# Patient Record
Sex: MALE | Race: WHITE | NOT HISPANIC OR LATINO
[De-identification: names, ages, dates, MRNs, and addresses within clinical notes are randomized per-mention and may not be internally consistent; named-entity substitution may affect disease eponyms.]

---

## 2020-10-17 ENCOUNTER — TRANSCRIPTION ENCOUNTER (OUTPATIENT)
Age: 60
End: 2020-10-17

## 2020-10-17 VITALS
HEIGHT: 69 IN | HEART RATE: 81 BPM | RESPIRATION RATE: 20 BRPM | WEIGHT: 205.03 LBS | SYSTOLIC BLOOD PRESSURE: 167 MMHG | OXYGEN SATURATION: 98 % | DIASTOLIC BLOOD PRESSURE: 97 MMHG | TEMPERATURE: 98 F

## 2020-10-17 LAB
ALBUMIN SERPL ELPH-MCNC: 3.7 G/DL — SIGNIFICANT CHANGE UP (ref 3.4–5)
ALP SERPL-CCNC: 57 U/L — SIGNIFICANT CHANGE UP (ref 40–120)
ALT FLD-CCNC: 60 U/L — HIGH (ref 12–42)
ANION GAP SERPL CALC-SCNC: 6 MMOL/L — LOW (ref 9–16)
APTT BLD: 29.6 SEC — SIGNIFICANT CHANGE UP (ref 27.5–35.5)
AST SERPL-CCNC: 56 U/L — HIGH (ref 15–37)
BASOPHILS # BLD AUTO: 0.05 K/UL — SIGNIFICANT CHANGE UP (ref 0–0.2)
BASOPHILS NFR BLD AUTO: 0.7 % — SIGNIFICANT CHANGE UP (ref 0–2)
BILIRUB SERPL-MCNC: 0.5 MG/DL — SIGNIFICANT CHANGE UP (ref 0.2–1.2)
BUN SERPL-MCNC: 21 MG/DL — SIGNIFICANT CHANGE UP (ref 7–23)
CALCIUM SERPL-MCNC: 9 MG/DL — SIGNIFICANT CHANGE UP (ref 8.5–10.5)
CHLORIDE SERPL-SCNC: 104 MMOL/L — SIGNIFICANT CHANGE UP (ref 96–108)
CO2 SERPL-SCNC: 31 MMOL/L — SIGNIFICANT CHANGE UP (ref 22–31)
CREAT SERPL-MCNC: 1.42 MG/DL — HIGH (ref 0.5–1.3)
EOSINOPHIL # BLD AUTO: 0.23 K/UL — SIGNIFICANT CHANGE UP (ref 0–0.5)
EOSINOPHIL NFR BLD AUTO: 3.3 % — SIGNIFICANT CHANGE UP (ref 0–6)
GLUCOSE SERPL-MCNC: 104 MG/DL — HIGH (ref 70–99)
HCT VFR BLD CALC: 41.6 % — SIGNIFICANT CHANGE UP (ref 39–50)
HGB BLD-MCNC: 14.2 G/DL — SIGNIFICANT CHANGE UP (ref 13–17)
IMM GRANULOCYTES NFR BLD AUTO: 1.8 % — HIGH (ref 0–1.5)
INR BLD: 0.97 — SIGNIFICANT CHANGE UP (ref 0.88–1.16)
LIDOCAIN IGE QN: 232 U/L — SIGNIFICANT CHANGE UP (ref 73–393)
LYMPHOCYTES # BLD AUTO: 3.41 K/UL — HIGH (ref 1–3.3)
LYMPHOCYTES # BLD AUTO: 48.4 % — HIGH (ref 13–44)
MCHC RBC-ENTMCNC: 30.9 PG — SIGNIFICANT CHANGE UP (ref 27–34)
MCHC RBC-ENTMCNC: 34.1 GM/DL — SIGNIFICANT CHANGE UP (ref 32–36)
MCV RBC AUTO: 90.6 FL — SIGNIFICANT CHANGE UP (ref 80–100)
MONOCYTES # BLD AUTO: 0.81 K/UL — SIGNIFICANT CHANGE UP (ref 0–0.9)
MONOCYTES NFR BLD AUTO: 11.5 % — SIGNIFICANT CHANGE UP (ref 2–14)
NEUTROPHILS # BLD AUTO: 2.42 K/UL — SIGNIFICANT CHANGE UP (ref 1.8–7.4)
NEUTROPHILS NFR BLD AUTO: 34.3 % — LOW (ref 43–77)
NRBC # BLD: 0 /100 WBCS — SIGNIFICANT CHANGE UP (ref 0–0)
NT-PROBNP SERPL-SCNC: 22 PG/ML — SIGNIFICANT CHANGE UP
PLATELET # BLD AUTO: 183 K/UL — SIGNIFICANT CHANGE UP (ref 150–400)
POTASSIUM SERPL-MCNC: 4 MMOL/L — SIGNIFICANT CHANGE UP (ref 3.5–5.3)
POTASSIUM SERPL-SCNC: 4 MMOL/L — SIGNIFICANT CHANGE UP (ref 3.5–5.3)
PROT SERPL-MCNC: 7.1 G/DL — SIGNIFICANT CHANGE UP (ref 6.4–8.2)
PROTHROM AB SERPL-ACNC: 11.5 SEC — SIGNIFICANT CHANGE UP (ref 10.6–13.6)
RBC # BLD: 4.59 M/UL — SIGNIFICANT CHANGE UP (ref 4.2–5.8)
RBC # FLD: 11.9 % — SIGNIFICANT CHANGE UP (ref 10.3–14.5)
SODIUM SERPL-SCNC: 141 MMOL/L — SIGNIFICANT CHANGE UP (ref 132–145)
TROPONIN I SERPL-MCNC: <0.017 NG/ML — LOW (ref 0.02–0.06)
WBC # BLD: 7.05 K/UL — SIGNIFICANT CHANGE UP (ref 3.8–10.5)
WBC # FLD AUTO: 7.05 K/UL — SIGNIFICANT CHANGE UP (ref 3.8–10.5)

## 2020-10-17 PROCEDURE — 71046 X-RAY EXAM CHEST 2 VIEWS: CPT | Mod: 26

## 2020-10-17 RX ORDER — ASPIRIN/CALCIUM CARB/MAGNESIUM 324 MG
162 TABLET ORAL ONCE
Refills: 0 | Status: COMPLETED | OUTPATIENT
Start: 2020-10-17 | End: 2020-10-17

## 2020-10-17 RX ORDER — IOHEXOL 300 MG/ML
30 INJECTION, SOLUTION INTRAVENOUS ONCE
Refills: 0 | Status: COMPLETED | OUTPATIENT
Start: 2020-10-17 | End: 2020-10-17

## 2020-10-17 RX ADMIN — Medication 162 MILLIGRAM(S): at 23:28

## 2020-10-17 RX ADMIN — Medication 30 MILLILITER(S): at 23:28

## 2020-10-17 RX ADMIN — IOHEXOL 30 MILLILITER(S): 300 INJECTION, SOLUTION INTRAVENOUS at 23:28

## 2020-10-17 NOTE — ED PROVIDER NOTE - CLINICAL SUMMARY MEDICAL DECISION MAKING FREE TEXT BOX
Pt w epigastric pain, lower chest pain, on evaluation tender in epigastric area. EKG non ischemic. Will check cardiac labs, abd labs, get CXR, treat w GI cocktail and give ASA. Bedside GB sono - no signs of cholecystitis.

## 2020-10-17 NOTE — ED PROVIDER NOTE - PROGRESS NOTE DETAILS
labs w transaminitis, normal lipase, normal white count. CT w concern for early cholecystitis, GB swelling w perichol inflammation. Spoke to Sx Dr Reynoso for admission. accepts to [facility].

## 2020-10-17 NOTE — ED PROVIDER NOTE - OBJECTIVE STATEMENT
60 male pt, hx of HTN (losartan), GERD (pepcid), and on Descovy (PreP). Presents w epigastric and lower mid chest pain, described as pressure, non radiating fo 1 hr. no nausea, no vomiting, no diaphoresis, no leg pain, no focal weakness, no leg swelling, no sob, no cough,. no fever, no chills. He took a Pepcid and baby ASA prior to arrival. Pain started after he ate some greek yogurt. He was concerned his pain was related to his heart so came to ED. no prior abd surgeries.

## 2020-10-17 NOTE — ED ADULT NURSE NOTE - OBJECTIVE STATEMENT
Pt is a 60y male complaining of epigastric pain described as ripping in nature x1 hour. Pt denies nausea and vomiting. Pt is a 60y male complaining of epigastric pain described as tearing/pressure  in nature x1 hour. Pt denies nausea, vomiting, and sob. Pt took baby aspirin and Pepcid prior to arrival.

## 2020-10-17 NOTE — ED ADULT NURSE NOTE - NSIMPLEMENTINTERV_GEN_ALL_ED
Implemented All Universal Safety Interventions:  [place] to call system. Call bell, personal items and telephone within reach. Instruct patient to call for assistance. Room bathroom lighting operational. Non-slip footwear when patient is off stretcher. Physically safe environment: no spills, clutter or unnecessary equipment. Stretcher in lowest position, wheels locked, appropriate side rails in place.

## 2020-10-17 NOTE — ED PROVIDER NOTE - DIAGNOSTIC INTERPRETATION
Interpreted by ED physician  Chest x-ray, 2 views  Lungs clear, heart shadow normal, bony structures normal, no free air under diaphragm, no PTX

## 2020-10-18 ENCOUNTER — INPATIENT (INPATIENT)
Facility: HOSPITAL | Age: 60
LOS: 0 days | Discharge: HOME | DRG: 418 | End: 2020-10-19
Attending: SURGERY | Admitting: SURGERY
Payer: COMMERCIAL

## 2020-10-18 ENCOUNTER — RESULT REVIEW (OUTPATIENT)
Age: 60
End: 2020-10-18

## 2020-10-18 DIAGNOSIS — N17.9 ACUTE KIDNEY FAILURE, UNSPECIFIED: ICD-10-CM

## 2020-10-18 DIAGNOSIS — R74.01 ELEVATION OF LEVELS OF LIVER TRANSAMINASE LEVELS: ICD-10-CM

## 2020-10-18 DIAGNOSIS — I10 ESSENTIAL (PRIMARY) HYPERTENSION: ICD-10-CM

## 2020-10-18 DIAGNOSIS — E66.9 OBESITY, UNSPECIFIED: ICD-10-CM

## 2020-10-18 DIAGNOSIS — K21.9 GASTRO-ESOPHAGEAL REFLUX DISEASE WITHOUT ESOPHAGITIS: ICD-10-CM

## 2020-10-18 DIAGNOSIS — R09.82 POSTNASAL DRIP: ICD-10-CM

## 2020-10-18 LAB
ALBUMIN SERPL ELPH-MCNC: 4.2 G/DL — SIGNIFICANT CHANGE UP (ref 3.3–5)
ALP SERPL-CCNC: 86 U/L — SIGNIFICANT CHANGE UP (ref 40–120)
ALT FLD-CCNC: 307 U/L — HIGH (ref 10–45)
ANION GAP SERPL CALC-SCNC: 9 MMOL/L — SIGNIFICANT CHANGE UP (ref 5–17)
APPEARANCE UR: CLEAR — SIGNIFICANT CHANGE UP
AST SERPL-CCNC: 247 U/L — HIGH (ref 10–40)
BILIRUB SERPL-MCNC: 0.5 MG/DL — SIGNIFICANT CHANGE UP (ref 0.2–1.2)
BILIRUB UR-MCNC: NEGATIVE — SIGNIFICANT CHANGE UP
BUN SERPL-MCNC: 13 MG/DL — SIGNIFICANT CHANGE UP (ref 7–23)
CALCIUM SERPL-MCNC: 9.1 MG/DL — SIGNIFICANT CHANGE UP (ref 8.4–10.5)
CHLORIDE SERPL-SCNC: 102 MMOL/L — SIGNIFICANT CHANGE UP (ref 96–108)
CO2 SERPL-SCNC: 26 MMOL/L — SIGNIFICANT CHANGE UP (ref 22–31)
COLOR SPEC: YELLOW — SIGNIFICANT CHANGE UP
CREAT SERPL-MCNC: 1.01 MG/DL — SIGNIFICANT CHANGE UP (ref 0.5–1.3)
DIFF PNL FLD: NEGATIVE — SIGNIFICANT CHANGE UP
GLUCOSE SERPL-MCNC: 103 MG/DL — HIGH (ref 70–99)
GLUCOSE UR QL: NEGATIVE — SIGNIFICANT CHANGE UP
HCT VFR BLD CALC: 41.6 % — SIGNIFICANT CHANGE UP (ref 39–50)
HGB BLD-MCNC: 14.2 G/DL — SIGNIFICANT CHANGE UP (ref 13–17)
KETONES UR-MCNC: NEGATIVE — SIGNIFICANT CHANGE UP
LEUKOCYTE ESTERASE UR-ACNC: NEGATIVE — SIGNIFICANT CHANGE UP
MAGNESIUM SERPL-MCNC: 2.3 MG/DL — SIGNIFICANT CHANGE UP (ref 1.6–2.6)
MCHC RBC-ENTMCNC: 31.1 PG — SIGNIFICANT CHANGE UP (ref 27–34)
MCHC RBC-ENTMCNC: 34.1 GM/DL — SIGNIFICANT CHANGE UP (ref 32–36)
MCV RBC AUTO: 91.2 FL — SIGNIFICANT CHANGE UP (ref 80–100)
NITRITE UR-MCNC: NEGATIVE — SIGNIFICANT CHANGE UP
NRBC # BLD: 0 /100 WBCS — SIGNIFICANT CHANGE UP (ref 0–0)
PCP SPEC-MCNC: SIGNIFICANT CHANGE UP
PH UR: 6 — SIGNIFICANT CHANGE UP (ref 5–8)
PHOSPHATE SERPL-MCNC: 3.1 MG/DL — SIGNIFICANT CHANGE UP (ref 2.5–4.5)
PLATELET # BLD AUTO: 176 K/UL — SIGNIFICANT CHANGE UP (ref 150–400)
POTASSIUM SERPL-MCNC: 4.6 MMOL/L — SIGNIFICANT CHANGE UP (ref 3.5–5.3)
POTASSIUM SERPL-SCNC: 4.6 MMOL/L — SIGNIFICANT CHANGE UP (ref 3.5–5.3)
PROT SERPL-MCNC: 6.7 G/DL — SIGNIFICANT CHANGE UP (ref 6–8.3)
PROT UR-MCNC: NEGATIVE MG/DL — SIGNIFICANT CHANGE UP
RBC # BLD: 4.56 M/UL — SIGNIFICANT CHANGE UP (ref 4.2–5.8)
RBC # FLD: 11.8 % — SIGNIFICANT CHANGE UP (ref 10.3–14.5)
SARS-COV-2 RNA SPEC QL NAA+PROBE: SIGNIFICANT CHANGE UP
SODIUM SERPL-SCNC: 137 MMOL/L — SIGNIFICANT CHANGE UP (ref 135–145)
SP GR SPEC: 1.02 — SIGNIFICANT CHANGE UP (ref 1–1.03)
TROPONIN I SERPL-MCNC: <0.017 NG/ML — LOW (ref 0.02–0.06)
UROBILINOGEN FLD QL: 0.2 E.U./DL — SIGNIFICANT CHANGE UP
WBC # BLD: 6.36 K/UL — SIGNIFICANT CHANGE UP (ref 3.8–10.5)
WBC # FLD AUTO: 6.36 K/UL — SIGNIFICANT CHANGE UP (ref 3.8–10.5)

## 2020-10-18 PROCEDURE — 88300 SURGICAL PATH GROSS: CPT | Mod: 26

## 2020-10-18 PROCEDURE — 71046 X-RAY EXAM CHEST 2 VIEWS: CPT | Mod: 26

## 2020-10-18 PROCEDURE — 88304 TISSUE EXAM BY PATHOLOGIST: CPT | Mod: 26

## 2020-10-18 PROCEDURE — 74177 CT ABD & PELVIS W/CONTRAST: CPT | Mod: 26

## 2020-10-18 PROCEDURE — 93010 ELECTROCARDIOGRAM REPORT: CPT

## 2020-10-18 PROCEDURE — 99223 1ST HOSP IP/OBS HIGH 75: CPT

## 2020-10-18 PROCEDURE — 99285 EMERGENCY DEPT VISIT HI MDM: CPT | Mod: 25

## 2020-10-18 RX ORDER — KETOROLAC TROMETHAMINE 30 MG/ML
15 SYRINGE (ML) INJECTION EVERY 6 HOURS
Refills: 0 | Status: DISCONTINUED | OUTPATIENT
Start: 2020-10-18 | End: 2020-10-19

## 2020-10-18 RX ORDER — ONDANSETRON 8 MG/1
4 TABLET, FILM COATED ORAL ONCE
Refills: 0 | Status: COMPLETED | OUTPATIENT
Start: 2020-10-18 | End: 2020-10-18

## 2020-10-18 RX ORDER — OXYCODONE HYDROCHLORIDE 5 MG/1
5 TABLET ORAL EVERY 4 HOURS
Refills: 0 | Status: DISCONTINUED | OUTPATIENT
Start: 2020-10-18 | End: 2020-10-19

## 2020-10-18 RX ORDER — CEFTRIAXONE 500 MG/1
1000 INJECTION, POWDER, FOR SOLUTION INTRAMUSCULAR; INTRAVENOUS ONCE
Refills: 0 | Status: COMPLETED | OUTPATIENT
Start: 2020-10-18 | End: 2020-10-18

## 2020-10-18 RX ORDER — ACETAMINOPHEN 500 MG
1000 TABLET ORAL ONCE
Refills: 0 | Status: COMPLETED | OUTPATIENT
Start: 2020-10-18 | End: 2020-10-18

## 2020-10-18 RX ORDER — METRONIDAZOLE 500 MG
500 TABLET ORAL EVERY 8 HOURS
Refills: 0 | Status: DISCONTINUED | OUTPATIENT
Start: 2020-10-18 | End: 2020-10-18

## 2020-10-18 RX ORDER — OXYCODONE HYDROCHLORIDE 5 MG/1
2.5 TABLET ORAL EVERY 4 HOURS
Refills: 0 | Status: DISCONTINUED | OUTPATIENT
Start: 2020-10-18 | End: 2020-10-19

## 2020-10-18 RX ORDER — ACETAMINOPHEN 500 MG
650 TABLET ORAL EVERY 4 HOURS
Refills: 0 | Status: DISCONTINUED | OUTPATIENT
Start: 2020-10-18 | End: 2020-10-19

## 2020-10-18 RX ORDER — METRONIDAZOLE 500 MG
TABLET ORAL
Refills: 0 | Status: DISCONTINUED | OUTPATIENT
Start: 2020-10-18 | End: 2020-10-18

## 2020-10-18 RX ORDER — METRONIDAZOLE 500 MG
500 TABLET ORAL ONCE
Refills: 0 | Status: COMPLETED | OUTPATIENT
Start: 2020-10-18 | End: 2020-10-18

## 2020-10-18 RX ORDER — INFLUENZA VIRUS VACCINE 15; 15; 15; 15 UG/.5ML; UG/.5ML; UG/.5ML; UG/.5ML
0.5 SUSPENSION INTRAMUSCULAR ONCE
Refills: 0 | Status: DISCONTINUED | OUTPATIENT
Start: 2020-10-18 | End: 2020-10-19

## 2020-10-18 RX ORDER — HEPARIN SODIUM 5000 [USP'U]/ML
5000 INJECTION INTRAVENOUS; SUBCUTANEOUS EVERY 8 HOURS
Refills: 0 | Status: DISCONTINUED | OUTPATIENT
Start: 2020-10-18 | End: 2020-10-19

## 2020-10-18 RX ORDER — SODIUM CHLORIDE 9 MG/ML
1000 INJECTION, SOLUTION INTRAVENOUS
Refills: 0 | Status: DISCONTINUED | OUTPATIENT
Start: 2020-10-18 | End: 2020-10-19

## 2020-10-18 RX ADMIN — Medication 100 MILLIGRAM(S): at 09:57

## 2020-10-18 RX ADMIN — SODIUM CHLORIDE 100 MILLILITER(S): 9 INJECTION, SOLUTION INTRAVENOUS at 23:48

## 2020-10-18 RX ADMIN — Medication 650 MILLIGRAM(S): at 21:29

## 2020-10-18 RX ADMIN — Medication 15 MILLIGRAM(S): at 16:06

## 2020-10-18 RX ADMIN — Medication 650 MILLIGRAM(S): at 22:29

## 2020-10-18 RX ADMIN — Medication 1000 MILLIGRAM(S): at 16:12

## 2020-10-18 RX ADMIN — Medication 15 MILLIGRAM(S): at 16:12

## 2020-10-18 RX ADMIN — CEFTRIAXONE 100 MILLIGRAM(S): 500 INJECTION, POWDER, FOR SOLUTION INTRAMUSCULAR; INTRAVENOUS at 03:46

## 2020-10-18 RX ADMIN — ONDANSETRON 4 MILLIGRAM(S): 8 TABLET, FILM COATED ORAL at 16:12

## 2020-10-18 RX ADMIN — CEFTRIAXONE 1000 MILLIGRAM(S): 500 INJECTION, POWDER, FOR SOLUTION INTRAMUSCULAR; INTRAVENOUS at 04:16

## 2020-10-18 RX ADMIN — Medication 400 MILLIGRAM(S): at 15:45

## 2020-10-18 RX ADMIN — Medication 15 MILLIGRAM(S): at 23:45

## 2020-10-18 NOTE — H&P ADULT - ASSESSMENT
59 y/o M with pmh of HTN, GERD, cholelithiasis with no psh presenting with acute calculous cholecystitis.     Plan:   Admit to general surgery Team 4  OR for laparoscopic cholecystectomy- risks, alternatives to treatment, and benefits were discussed with the patient and he was agreeable to the procedure  NPO/IVF  Rocephin/flagyl  T+S x2  COVID19 neg  CXR negative for acute process  Analgesics PRN  Antiemetics PRN  VTE PPX with SCDs and SQH

## 2020-10-18 NOTE — CONSULT NOTE ADULT - ASSESSMENT
60M w/HTN, GERD, cholelithiasis, postnasal drip adm to surgical service w/acute calculous cholecystitis now s/p lap kristian.

## 2020-10-18 NOTE — H&P ADULT - NSHPLABSRESULTS_GEN_ALL_CORE
LABS:                        14.2   6.36  )-----------( 176      ( 18 Oct 2020 10:10 )             41.6     10    137  |  102  |  13  ----------------------------<  103<H>  4.6   |  26  |  1.01    Ca    9.1      18 Oct 2020 10:10  Phos  3.1     10-18  Mg     2.3     10-18    TPro  6.7  /  Alb  4.2  /  TBili  0.5  /  DBili  x   /  AST  247<H>  /  ALT  307<H>  /  AlkPhos  86  10-18    PT/INR - ( 17 Oct 2020 23:15 )   PT: 11.5 sec;   INR: 0.97          PTT - ( 17 Oct 2020 23:15 )  PTT:29.6 sec  Urinalysis Basic - ( 18 Oct 2020 03:37 )    Color: Yellow / Appearance: Clear / S.025 / pH: x  Gluc: x / Ketone: NEGATIVE  / Bili: NEGATIVE / Urobili: 0.2 E.U./dL   Blood: x / Protein: NEGATIVE mg/dL / Nitrite: NEGATIVE   Leuk Esterase: NEGATIVE / RBC: x / WBC x   Sq Epi: x / Non Sq Epi: x / Bacteria: x        RADIOLOGY & ADDITIONAL STUDIES:  < from: CT Abdomen and Pelvis w/ Oral Cont and w/ IV Cont (10.18.20 @ 01:57) >    EXAM:  CT ABDOMEN AND PELVIS OC IC                           PROCEDURE DATE:  10/18/2020          INTERPRETATION:  CT of the ABDOMEN and PELVIS with intravenous contrast dated 10/17/2020 11:46 PM    INDICATION: Upper abdominal pain.    TECHNIQUE: CT of the abdomen and pelvis with intravenous and oral contrast. Axial, sagittal, and coronal images were obtained and reviewed. 100 cc of Omnipaque 350 intravenous contrast was administered; 5 cc was discarded.    COMPARISON: CT of the abdomen and pelvis dated 2016.  FINDINGS:    Lower chest: Clear lung bases.    Liver: Smooth contour. Stable subcentimeter hypodensity at the right hepatic dome.    Biliary system: Mild diffuse gallbladder wall thickening and mild pericholecystic fat stranding.Few tiny layering calcified gallstones. No biliary ductal dilatation.    Pancreas: Unremarkable.    Spleen: Mildly enlarged measuring up to 14 cm in transaxial diameter, similar to previous exam.    Adrenal glands: Unremarkable.    Kidneys: Symmetricparenchymal enhancement. No renal mass. No hydronephrosis. No renal or ureteral stone.    Bowel/Peritoneum: Stomach is distended with ingested material. Normal small and large bowel caliber. No appreciable wall thickening. Normal appendix. 4 cm periampullary duodenal diverticulum, mildly increased since 2016. No extraluminal gas or ascites.    Pelvic organs: Unremarkable.    Lymph nodes: No lymphadenopathy.    Vascular: Normal caliber aorta. Mild aortoiliac calcifications.    Bones/Soft tissues: Mild degenerative changes of the spine. Slight levocurvature of the lumbar spine, unchanged. Small fat-containing umbilical and right inguinal hernias, unchanged.      IMPRESSION:  Cholelithiasis, mild gallbladder wall thickening, and mild pericholecystic fat stranding. Findings are suspicious for acute cholecystitis.          Thank you for the opportunity to participate in the care of this patient.    JUDSON JUSTICE M.D., RADIOLOGY RESIDENT  This document has been electronically signed.  NORI DOYLE M.D., ATTENDING RADIOLOGIST  This document has been electronically signed. Oct 18 2020  2:21AM

## 2020-10-18 NOTE — BRIEF OPERATIVE NOTE - OPERATION/FINDINGS
Diagnostic laparoscopy via Keo technique revealing umbilical hernia and a nongangrenous, nonperforated acute cholecystitis. Critical view of safety achieved and the cystic duct and artery were clipped and divided individually. Hemostasis achieved at the end of the case. Diagnostic laparoscopy via Keo technique revealing a small umbilical hernia and a nongangrenous, nonperforated acute cholecystitis. Critical view of safety achieved and the cystic duct and artery were clipped and divided individually. Hemostasis achieved at the end of the case. Fascia closed primarily with vicryl, skin with monocryl.

## 2020-10-18 NOTE — CONSULT NOTE ADULT - SUBJECTIVE AND OBJECTIVE BOX
Patient is a 60y old  Male who presents with a chief complaint of calculous acute cholecystitis (18 Oct 2020 09:08)    60M w/HTN, GERD, cholelithiasis, postnasal drip who presents as a transfer from [facility] with 1 day history of persistent epigastric pain and tightness without associated nausea or vomiting. Reports spontaneous onset of pain while watching TV, and due to severity and persistence of pain went to the ED. Denies similar symptoms in the past, but does report intermittent chest tightness in the same region that would spontaneously remit. Last bowel movement was yesterday and normal. Last colonoscopy was five years ago notable for benign polyp. Otherwise denies f/c, n/v, CP, SOB, palpitations, abdominal pain, melena, hematochezia, weakness or pain in lower extremities, or dysuria.    At [facility] vitals notable for  with all other vitals wnl. Inital labs notable for WBC 7.05 (lymphocytes 48.4%), Cr 1.42 (unknown baseline), TBili 0.5, ALP 57, AST 56, ALT 60. CT A/P notable for gallbladder wall thickening, pericholecystic fat stranding, cholelithiasis, and CBD wnl.     Admitted to the surgical service on 10/18 and taken to the OR, now s/p lap kristian. Seen in PACU    INTERVAL HPI/OVERNIGHT EVENTS:  T(C): 36.8 (1020 @ 15:33), Max: 37.3 (10-18-20 @ 12:53)  HR: 73 (10-18-20 @ 16:24) (65 - 97)  BP: 142/92 (10-18-20 @ 16:24) (129/85 - 167/97)  RR: 18 (10-18-20 @ 16:24) (14 - 20)  SpO2: 98% (10-18-20 @ 16:24) (96% - 100%)  Wt(kg): --  I&O's Summary    18 Oct 2020 07:01  -  18 Oct 2020 17:22  --------------------------------------------------------  IN: 850 mL / OUT: 300 mL / NET: 550 mL        PAST MEDICAL & SURGICAL HISTORY:  H/O gastroesophageal reflux (GERD)    Hypertension    No significant past surgical history        SOCIAL HISTORY  Alcohol:  Tobacco:  Illicit substance use:      FAMILY HISTORY:      LABS:                        14.2   6.36  )-----------( 176      ( 18 Oct 2020 10:10 )             41.6     10-18    137  |  102  |  13  ----------------------------<  103<H>  4.6   |  26  |  1.01    Ca    9.1      18 Oct 2020 10:10  Phos  3.1     10-18  Mg     2.3     10-18    TPro  6.7  /  Alb  4.2  /  TBili  0.5  /  DBili  x   /  AST  247<H>  /  ALT  307<H>  /  AlkPhos  86  10-18    PT/INR - ( 17 Oct 2020 23:15 )   PT: 11.5 sec;   INR: 0.97          PTT - ( 17 Oct 2020 23:15 )  PTT:29.6 sec  Urinalysis Basic - ( 18 Oct 2020 03:37 )    Color: Yellow / Appearance: Clear / S.025 / pH: x  Gluc: x / Ketone: NEGATIVE  / Bili: NEGATIVE / Urobili: 0.2 E.U./dL   Blood: x / Protein: NEGATIVE mg/dL / Nitrite: NEGATIVE   Leuk Esterase: NEGATIVE / RBC: x / WBC x   Sq Epi: x / Non Sq Epi: x / Bacteria: x      CAPILLARY BLOOD GLUCOSE            Urinalysis Basic - ( 18 Oct 2020 03:37 )    Color: Yellow / Appearance: Clear / S.025 / pH: x  Gluc: x / Ketone: NEGATIVE  / Bili: NEGATIVE / Urobili: 0.2 E.U./dL   Blood: x / Protein: NEGATIVE mg/dL / Nitrite: NEGATIVE   Leuk Esterase: NEGATIVE / RBC: x / WBC x   Sq Epi: x / Non Sq Epi: x / Bacteria: x        MEDICATIONS  (STANDING):  acetaminophen   Tablet .. 650 milliGRAM(s) Oral every 4 hours  heparin   Injectable 5000 Unit(s) SubCutaneous every 8 hours  influenza   Vaccine 0.5 milliLiter(s) IntraMuscular once  ketorolac   Injectable 15 milliGRAM(s) IV Push every 6 hours  lactated ringers. 1000 milliLiter(s) (100 mL/Hr) IV Continuous <Continuous>    MEDICATIONS  (PRN):  oxyCODONE    IR 5 milliGRAM(s) Oral every 4 hours PRN Severe Pain (7 - 10)  oxyCODONE    IR 2.5 milliGRAM(s) Oral every 4 hours PRN Moderate Pain (4 - 6)      REVIEW OF SYSTEMS:  CONSTITUTIONAL: No fever, weight loss, or fatigue  EYES: No eye pain, visual disturbances, or discharge  ENMT:  No difficulty hearing, tinnitus, vertigo; No sinus or throat pain  NECK: No pain or stiffness  RESPIRATORY: No cough, wheezing, chills or hemoptysis; No shortness of breath  CARDIOVASCULAR: No chest pain, palpitations, dizziness, or leg swelling  GASTROINTESTINAL: No abdominal or epigastric pain. No nausea, vomiting, or hematemesis; No diarrhea or constipation. No melena or hematochezia.  GENITOURINARY: No dysuria, frequency, hematuria, or incontinence  NEUROLOGICAL: No headaches, memory loss, loss of strength, numbness, or tremors  SKIN: No itching, burning, rashes, or lesions   LYMPH NODES: No enlarged glands  ENDOCRINE: No heat or cold intolerance; No hair loss  MUSCULOSKELETAL: No joint pain or swelling; No muscle, back, or extremity pain  PSYCHIATRIC: No depression, anxiety, mood swings, or difficulty sleeping  HEME/LYMPH: No easy bruising, or bleeding gums  ALLERY AND IMMUNOLOGIC: No hives or eczema    RADIOLOGY & ADDITIONAL TESTS:    Imaging Personally Reviewed:  [ ] YES  [ ] NO    Consultant(s) Notes Reviewed:  [ ] YES  [ ] NO    PHYSICAL EXAM:  GENERAL: NAD, well-groomed, well-developed  HEAD:  Atraumatic, Normocephalic  EYES: EOMI, PERRLA, conjunctiva and sclera clear  ENMT: No tonsillar erythema, exudates, or enlargement; Moist mucous membranes, Good dentition, No lesions  NECK: Supple, No JVD, Normal thyroid  NERVOUS SYSTEM:  Alert & Oriented X3, Good concentration; Motor Strength 5/5 B/L upper and lower extremities; DTRs 2+ intact and symmetric  CHEST/LUNG: Clear to percussion bilaterally; No rales, rhonchi, wheezing, or rubs  HEART: Regular rate and rhythm; No murmurs, rubs, or gallops  ABDOMEN: Soft, Nontender, Nondistended; Bowel sounds present  EXTREMITIES:  2+ Peripheral Pulses, No clubbing, cyanosis, or edema  LYMPH: No lymphadenopathy noted  SKIN: No rashes or lesions    Care Discussed with Consultants/Other Providers [ ] YES  [ ] NO Patient is a 60y old  Male who presents with a chief complaint of calculous acute cholecystitis (18 Oct 2020 09:08)    60M w/HTN, GERD, cholelithiasis, postnasal drip who presents as a transfer from [facility] with 1 day history of persistent epigastric pain and tightness without associated nausea or vomiting. Reports spontaneous onset of pain while watching TV, and due to severity and persistence of pain went to the ED. Denies similar symptoms in the past, but does report intermittent chest tightness in the same region that would spontaneously remit. Last bowel movement was yesterday and normal. Last colonoscopy was five years ago notable for benign polyp. Otherwise denies f/c, n/v, CP, SOB, palpitations, abdominal pain, melena, hematochezia, weakness or pain in lower extremities, or dysuria.    At [facility] vitals notable for  with all other vitals wnl. Inital labs notable for WBC 7.05 (lymphocytes 48.4%), Cr 1.42 (unknown baseline), TBili 0.5, ALP 57, AST 56, ALT 60. CT A/P notable for gallbladder wall thickening, pericholecystic fat stranding, cholelithiasis, and CBD wnl.     Admitted to the surgical service on 10/18 and taken to the OR, now s/p adrien townsend. Seen in PACU afterwards. Patient states he has some discomfort at incision sites but otherwise denies any complaints. States presenting abdominal pain has resolved. Was diagnosed with cholelithiasis 4 years ago and has had intermittent abdominal discomfort since then of less severity than the presenting pain for this admission. Follows with Dr. Marcial Marina at [facility] for regular medical care. Denies fevers, chills, nausea, vomiting, chest pain, palpitations, SOB.    INTERVAL HPI/OVERNIGHT EVENTS:  T(C): 36.8 (10-18-20 @ 15:33), Max: 37.3 (10-18-20 @ 12:53)  HR: 73 (10-18-20 @ 16:24) (65 - 97)  BP: 142/92 (10-18-20 @ 16:24) (129/85 - 167/97)  RR: 18 (10-18-20 @ 16:24) (14 - 20)  SpO2: 98% (10-18-20 @ 16:24) (96% - 100%)  Wt(kg): --  I&O's Summary    18 Oct 2020 07:01  -  18 Oct 2020 17:22  --------------------------------------------------------  IN: 850 mL / OUT: 300 mL / NET: 550 mL        PAST MEDICAL & SURGICAL HISTORY:  H/O gastroesophageal reflux (GERD)    Hypertension    No significant past surgical history        SOCIAL HISTORY  Alcohol: Drinks 3x/wk, up to 3 mixed drinks per sitting  Tobacco: 2PPD x 30 yrs, stopped 10 yrs ago.   Illicit substance use: Occasional marijuana use      FAMILY HISTORY:      LABS:                        14.2   6.36  )-----------( 176      ( 18 Oct 2020 10:10 )             41.6     10-18    137  |  102  |  13  ----------------------------<  103<H>  4.6   |  26  |  1.01    Ca    9.1      18 Oct 2020 10:10  Phos  3.1     10-18  Mg     2.3     10-18    TPro  6.7  /  Alb  4.2  /  TBili  0.5  /  DBili  x   /  AST  247<H>  /  ALT  307<H>  /  AlkPhos  86  10-18    PT/INR - ( 17 Oct 2020 23:15 )   PT: 11.5 sec;   INR: 0.97          PTT - ( 17 Oct 2020 23:15 )  PTT:29.6 sec  Urinalysis Basic - ( 18 Oct 2020 03:37 )    Color: Yellow / Appearance: Clear / S.025 / pH: x  Gluc: x / Ketone: NEGATIVE  / Bili: NEGATIVE / Urobili: 0.2 E.U./dL   Blood: x / Protein: NEGATIVE mg/dL / Nitrite: NEGATIVE   Leuk Esterase: NEGATIVE / RBC: x / WBC x   Sq Epi: x / Non Sq Epi: x / Bacteria: x      CAPILLARY BLOOD GLUCOSE            Urinalysis Basic - ( 18 Oct 2020 03:37 )    Color: Yellow / Appearance: Clear / S.025 / pH: x  Gluc: x / Ketone: NEGATIVE  / Bili: NEGATIVE / Urobili: 0.2 E.U./dL   Blood: x / Protein: NEGATIVE mg/dL / Nitrite: NEGATIVE   Leuk Esterase: NEGATIVE / RBC: x / WBC x   Sq Epi: x / Non Sq Epi: x / Bacteria: x        MEDICATIONS  (STANDING):  acetaminophen   Tablet .. 650 milliGRAM(s) Oral every 4 hours  heparin   Injectable 5000 Unit(s) SubCutaneous every 8 hours  influenza   Vaccine 0.5 milliLiter(s) IntraMuscular once  ketorolac   Injectable 15 milliGRAM(s) IV Push every 6 hours  lactated ringers. 1000 milliLiter(s) (100 mL/Hr) IV Continuous <Continuous>    MEDICATIONS  (PRN):  oxyCODONE    IR 5 milliGRAM(s) Oral every 4 hours PRN Severe Pain (7 - 10)  oxyCODONE    IR 2.5 milliGRAM(s) Oral every 4 hours PRN Moderate Pain (4 - 6)      REVIEW OF SYSTEMS:  CONSTITUTIONAL: No fever, weight loss, or fatigue  EYES: No eye pain, visual disturbances, or discharge  ENMT:  No difficulty hearing, tinnitus, vertigo; No sinus or throat pain  NECK: No pain or stiffness  RESPIRATORY: No cough, wheezing, chills or hemoptysis; No shortness of breath  CARDIOVASCULAR: No chest pain, palpitations, dizziness, or leg swelling  GASTROINTESTINAL: No abdominal or epigastric pain. No nausea, vomiting, or hematemesis; No diarrhea or constipation. No melena or hematochezia.  GENITOURINARY: No dysuria, frequency, hematuria, or incontinence  NEUROLOGICAL: No headaches, memory loss, loss of strength, numbness, or tremors  SKIN: No itching, burning, rashes, or lesions   LYMPH NODES: No enlarged glands  ENDOCRINE: No heat or cold intolerance; No hair loss  MUSCULOSKELETAL: No joint pain or swelling; No muscle, back, or extremity pain  PSYCHIATRIC: No depression, anxiety, mood swings, or difficulty sleeping  HEME/LYMPH: No easy bruising, or bleeding gums  ALLERY AND IMMUNOLOGIC: No hives or eczema    RADIOLOGY & ADDITIONAL TESTS:    Imaging Personally Reviewed:  [ ] YES  [ ] NO    Consultant(s) Notes Reviewed:  [ ] YES  [ ] NO    PHYSICAL EXAM:  GENERAL: NAD, well-groomed, well-developed  HEAD:  Atraumatic, Normocephalic  EYES: EOMI, PERRLA, conjunctiva and sclera clear  ENMT: No tonsillar erythema, exudates, or enlargement; Moist mucous membranes, Good dentition, No lesions  NECK: Supple, No JVD, Normal thyroid  NERVOUS SYSTEM:  Alert & Oriented X3, Good concentration; Motor Strength 5/5 B/L upper and lower extremities; DTRs 2+ intact and symmetric  CHEST/LUNG: Clear to percussion bilaterally; No rales, rhonchi, wheezing, or rubs  HEART: Regular rate and rhythm; No murmurs, rubs, or gallops  ABDOMEN: Soft, Nontender, Nondistended; Bowel sounds present (+) surgical incisions  EXTREMITIES:  2+ Peripheral Pulses, No clubbing, cyanosis, or edema  LYMPH: No lymphadenopathy noted  SKIN: No rashes or lesions    Care Discussed with Consultants/Other Providers [ ] YES  [ ] NO

## 2020-10-18 NOTE — ED ADULT NURSE REASSESSMENT NOTE - NS ED NURSE REASSESS COMMENT FT1
Patient medicated with iv abx as per md order. Patient is for admission. No distress noted. As per ed attending, pt currently to be NPO. Pt informed and verbalizes understanding.

## 2020-10-18 NOTE — ED ADULT NURSE REASSESSMENT NOTE - NS ED NURSE REASSESS COMMENT FT1
Pt retuned from Ct scan. Pt pending repeat blood work at 0300. Pt updated on plan of care. Will continue to monitor.

## 2020-10-18 NOTE — CONSULT NOTE ADULT - PROBLEM SELECTOR RECOMMENDATION 9
On home losartan 50mg PO QD. Initially had MARIAM which has since improved with fluid resuscitation  - can restart home losartan 100mg PO QD On home losartan 100mg PO QD. Initially had MARIAM which has since improved with fluid resuscitation  - can restart home losartan 100mg PO QD

## 2020-10-18 NOTE — H&P ADULT - NSHPPHYSICALEXAM_GEN_ALL_CORE
Vital Signs Last 24 Hrs  T(C): 36.9 (18 Oct 2020 08:57), Max: 37 (18 Oct 2020 06:48)  T(F): 98.5 (18 Oct 2020 08:57), Max: 98.6 (18 Oct 2020 06:48)  HR: 65 (18 Oct 2020 08:57) (65 - 81)  BP: 152/88 (18 Oct 2020 08:57) (129/85 - 167/97)  BP(mean): --  RR: 16 (18 Oct 2020 08:57) (16 - 20)  SpO2: 96% (18 Oct 2020 08:57) (96% - 98%)  I&O's Detail      General: NAD, resting comfortably in bed  C/V: NSR  Pulm: Nonlabored breathing, no respiratory distress  Abd: obese, minimally distended, Rivera's +. No rebound or guarding  Extrem: WWP, no edema, SCDs in place

## 2020-10-18 NOTE — H&P ADULT - HISTORY OF PRESENT ILLNESS
Leoncio Beatty is a 59 y/o M with a pmh of HTN, GERD, cholelithiasis and no psh who presents as a transfer from [facility] with 1 day history of persistent epigastric pain and tightness without associated nausea or vomiting. Reports spontaneous onset of pain while watching TV, and due to severity and persistence of pain went to the ED. Denies similar symptoms in the past, but does report intermittent chest tightness in the same region that would spontaneously remit. Last bowel movement was yesterday and normal. Last colonoscopy was five years ago notable for benign polyp. Otherwise denies f/c, n/v, CP, SOB, palpitations, abdominal pain, melena, hematochezia, weakness or pain in lower extremities, or dysuria.    At [facility] vitals Leoncio Beatty is a 61 y/o M with a pmh of HTN, GERD, cholelithiasis and no psh who presents as a transfer from [facility] with 1 day history of persistent epigastric pain and tightness without associated nausea or vomiting. Reports spontaneous onset of pain while watching TV, and due to severity and persistence of pain went to the ED. Denies similar symptoms in the past, but does report intermittent chest tightness in the same region that would spontaneously remit. Last bowel movement was yesterday and normal. Last colonoscopy was five years ago notable for benign polyp. Otherwise denies f/c, n/v, CP, SOB, palpitations, abdominal pain, melena, hematochezia, weakness or pain in lower extremities, or dysuria.    At [facility] vitals notable for  with all other vitals wnl. Inital labs notable for WBC 7.05 (lymphocytes 48.4%), Cr 1.42 (unknown baseline), TBili 0.5, ALP 57, AST 56, ALT 60. CT A/P notable for gallbladder wall thickening, pericholecystic fat stranding, cholelithiasis, and CBD wnl.

## 2020-10-18 NOTE — CONSULT NOTE ADULT - PROBLEM SELECTOR RECOMMENDATION 4
Presented with Cr of 1.42 which has since downtrended to 1.01. Suspect underlying CKD 2 with superimposed MARIAM. Resolved w/fluid repletion

## 2020-10-18 NOTE — PROGRESS NOTE ADULT - SUBJECTIVE AND OBJECTIVE BOX
Surgery Post-Op Note    Procedure: Lap cholecystectomy    Diagnosis/Indication: Acute cholecystitis    Surgeon: Dr Reynoso    S: Pt has no complaints. Tolerating clears well. OOBA. No nausea or vomiting. No flatus or BM yet. No complaints of pain.     O:  T(C): 37.3 (10-18-20 @ 20:45), Max: 37.3 (10-18-20 @ 20:45)  T(F): 99.2 (10-18-20 @ 20:45), Max: 99.2 (10-18-20 @ 20:45)  HR: 80 (10-18-20 @ 20:45) (70 - 80)  BP: 141/95 (10-18-20 @ 20:45) (138/84 - 141/95)  RR: 16 (10-18-20 @ 20:45) (16 - 17)  SpO2: 95% (10-18-20 @ 20:45) (95% - 95%)  Wt(kg): --                        14.2   6.36  )-----------( 176      ( 18 Oct 2020 10:10 )             41.6     10-18    137  |  102  |  13  ----------------------------<  103<H>  4.6   |  26  |  1.01    Ca    9.1      18 Oct 2020 10:10  Phos  3.1     10-18  Mg     2.3     10-18    TPro  6.7  /  Alb  4.2  /  TBili  0.5  /  DBili  x   /  AST  247<H>  /  ALT  307<H>  /  AlkPhos  86  10-18      Gen: NAD, resting comfortably in bed  C/V: NSR  Pulm: Nonlabored breathing, no respiratory distress  Abd: soft, NT/ND. Incisions dermabonded  Extrem: WWP, no calf edema, SCDs in place      A/P: 60yMale s/p above procedure  Diet: CLD, advance to low res in the AM   IVF: LR @ 100  Pain/nausea control: Tylenol and Oxy  DVT ppx: SQH  Dispo plan: DC tomorrow pending tolerance of diet

## 2020-10-19 ENCOUNTER — TRANSCRIPTION ENCOUNTER (OUTPATIENT)
Age: 60
End: 2020-10-19

## 2020-10-19 VITALS
HEART RATE: 69 BPM | OXYGEN SATURATION: 95 % | DIASTOLIC BLOOD PRESSURE: 86 MMHG | SYSTOLIC BLOOD PRESSURE: 134 MMHG | TEMPERATURE: 98 F | RESPIRATION RATE: 18 BRPM

## 2020-10-19 LAB
ALBUMIN SERPL ELPH-MCNC: 3.8 G/DL — SIGNIFICANT CHANGE UP (ref 3.3–5)
ALP SERPL-CCNC: 72 U/L — SIGNIFICANT CHANGE UP (ref 40–120)
ALT FLD-CCNC: 189 U/L — HIGH (ref 10–45)
ANION GAP SERPL CALC-SCNC: 10 MMOL/L — SIGNIFICANT CHANGE UP (ref 5–17)
AST SERPL-CCNC: 89 U/L — HIGH (ref 10–40)
BILIRUB DIRECT SERPL-MCNC: <0.2 MG/DL — SIGNIFICANT CHANGE UP (ref 0–0.2)
BILIRUB INDIRECT FLD-MCNC: SIGNIFICANT CHANGE UP MG/DL (ref 0.2–1)
BILIRUB SERPL-MCNC: 0.7 MG/DL — SIGNIFICANT CHANGE UP (ref 0.2–1.2)
BUN SERPL-MCNC: 12 MG/DL — SIGNIFICANT CHANGE UP (ref 7–23)
CALCIUM SERPL-MCNC: 8.6 MG/DL — SIGNIFICANT CHANGE UP (ref 8.4–10.5)
CHLORIDE SERPL-SCNC: 104 MMOL/L — SIGNIFICANT CHANGE UP (ref 96–108)
CO2 SERPL-SCNC: 24 MMOL/L — SIGNIFICANT CHANGE UP (ref 22–31)
CREAT SERPL-MCNC: 0.87 MG/DL — SIGNIFICANT CHANGE UP (ref 0.5–1.3)
GLUCOSE SERPL-MCNC: 117 MG/DL — HIGH (ref 70–99)
HCT VFR BLD CALC: 38.4 % — LOW (ref 39–50)
HCV AB S/CO SERPL IA: 0.09 S/CO — SIGNIFICANT CHANGE UP
HCV AB SERPL-IMP: SIGNIFICANT CHANGE UP
HGB BLD-MCNC: 13 G/DL — SIGNIFICANT CHANGE UP (ref 13–17)
MAGNESIUM SERPL-MCNC: 2.1 MG/DL — SIGNIFICANT CHANGE UP (ref 1.6–2.6)
MCHC RBC-ENTMCNC: 31 PG — SIGNIFICANT CHANGE UP (ref 27–34)
MCHC RBC-ENTMCNC: 33.9 GM/DL — SIGNIFICANT CHANGE UP (ref 32–36)
MCV RBC AUTO: 91.4 FL — SIGNIFICANT CHANGE UP (ref 80–100)
NRBC # BLD: 0 /100 WBCS — SIGNIFICANT CHANGE UP (ref 0–0)
PHOSPHATE SERPL-MCNC: 4.1 MG/DL — SIGNIFICANT CHANGE UP (ref 2.5–4.5)
PLATELET # BLD AUTO: 191 K/UL — SIGNIFICANT CHANGE UP (ref 150–400)
POTASSIUM SERPL-MCNC: 4.4 MMOL/L — SIGNIFICANT CHANGE UP (ref 3.5–5.3)
POTASSIUM SERPL-SCNC: 4.4 MMOL/L — SIGNIFICANT CHANGE UP (ref 3.5–5.3)
PROT SERPL-MCNC: 6 G/DL — SIGNIFICANT CHANGE UP (ref 6–8.3)
RBC # BLD: 4.2 M/UL — SIGNIFICANT CHANGE UP (ref 4.2–5.8)
RBC # FLD: 11.9 % — SIGNIFICANT CHANGE UP (ref 10.3–14.5)
SODIUM SERPL-SCNC: 138 MMOL/L — SIGNIFICANT CHANGE UP (ref 135–145)
WBC # BLD: 9.84 K/UL — SIGNIFICANT CHANGE UP (ref 3.8–10.5)
WBC # FLD AUTO: 9.84 K/UL — SIGNIFICANT CHANGE UP (ref 3.8–10.5)

## 2020-10-19 PROCEDURE — 83735 ASSAY OF MAGNESIUM: CPT

## 2020-10-19 PROCEDURE — 74177 CT ABD & PELVIS W/CONTRAST: CPT

## 2020-10-19 PROCEDURE — 80307 DRUG TEST PRSMV CHEM ANLYZR: CPT

## 2020-10-19 PROCEDURE — 85025 COMPLETE CBC W/AUTO DIFF WBC: CPT

## 2020-10-19 PROCEDURE — 84484 ASSAY OF TROPONIN QUANT: CPT

## 2020-10-19 PROCEDURE — 88304 TISSUE EXAM BY PATHOLOGIST: CPT

## 2020-10-19 PROCEDURE — 80076 HEPATIC FUNCTION PANEL: CPT

## 2020-10-19 PROCEDURE — 86850 RBC ANTIBODY SCREEN: CPT

## 2020-10-19 PROCEDURE — 81003 URINALYSIS AUTO W/O SCOPE: CPT

## 2020-10-19 PROCEDURE — 85610 PROTHROMBIN TIME: CPT

## 2020-10-19 PROCEDURE — 86901 BLOOD TYPING SEROLOGIC RH(D): CPT

## 2020-10-19 PROCEDURE — 80048 BASIC METABOLIC PNL TOTAL CA: CPT

## 2020-10-19 PROCEDURE — 88300 SURGICAL PATH GROSS: CPT

## 2020-10-19 PROCEDURE — 84100 ASSAY OF PHOSPHORUS: CPT

## 2020-10-19 PROCEDURE — 93005 ELECTROCARDIOGRAM TRACING: CPT

## 2020-10-19 PROCEDURE — 83880 ASSAY OF NATRIURETIC PEPTIDE: CPT

## 2020-10-19 PROCEDURE — 85730 THROMBOPLASTIN TIME PARTIAL: CPT

## 2020-10-19 PROCEDURE — 36415 COLL VENOUS BLD VENIPUNCTURE: CPT

## 2020-10-19 PROCEDURE — 85027 COMPLETE CBC AUTOMATED: CPT

## 2020-10-19 PROCEDURE — 96365 THER/PROPH/DIAG IV INF INIT: CPT | Mod: XU

## 2020-10-19 PROCEDURE — 86900 BLOOD TYPING SEROLOGIC ABO: CPT

## 2020-10-19 PROCEDURE — 86803 HEPATITIS C AB TEST: CPT

## 2020-10-19 PROCEDURE — 71046 X-RAY EXAM CHEST 2 VIEWS: CPT

## 2020-10-19 PROCEDURE — 87635 SARS-COV-2 COVID-19 AMP PRB: CPT

## 2020-10-19 PROCEDURE — 99285 EMERGENCY DEPT VISIT HI MDM: CPT | Mod: 25

## 2020-10-19 PROCEDURE — 80053 COMPREHEN METABOLIC PANEL: CPT

## 2020-10-19 PROCEDURE — 83690 ASSAY OF LIPASE: CPT

## 2020-10-19 RX ORDER — OXYCODONE HYDROCHLORIDE 5 MG/1
1 TABLET ORAL
Qty: 10 | Refills: 0
Start: 2020-10-19 | End: 2020-10-21

## 2020-10-19 RX ORDER — DOCUSATE SODIUM 100 MG
1 CAPSULE ORAL
Qty: 14 | Refills: 0
Start: 2020-10-19 | End: 2020-10-25

## 2020-10-19 RX ADMIN — HEPARIN SODIUM 5000 UNIT(S): 5000 INJECTION INTRAVENOUS; SUBCUTANEOUS at 05:09

## 2020-10-19 RX ADMIN — Medication 650 MILLIGRAM(S): at 06:48

## 2020-10-19 RX ADMIN — Medication 15 MILLIGRAM(S): at 06:48

## 2020-10-19 RX ADMIN — Medication 650 MILLIGRAM(S): at 06:00

## 2020-10-19 RX ADMIN — Medication 15 MILLIGRAM(S): at 06:00

## 2020-10-19 RX ADMIN — Medication 15 MILLIGRAM(S): at 00:02

## 2020-10-19 NOTE — DISCHARGE NOTE PROVIDER - NSDCCPCAREPLAN_GEN_ALL_CORE_FT
PRINCIPAL DISCHARGE DIAGNOSIS  Diagnosis: Cholecystitis  Assessment and Plan of Treatment:       SECONDARY DISCHARGE DIAGNOSES  Diagnosis: Transaminitis  Assessment and Plan of Treatment: Transaminitis    Diagnosis: Acute renal failure superimposed on stage 2 chronic kidney disease, unspecified acute renal failure type  Assessment and Plan of Treatment: Acute renal failure superimposed on stage 2 chronic kidney disease, unspecified acute renal failure type    Diagnosis: Postnasal drip  Assessment and Plan of Treatment: Postnasal drip    Diagnosis: GERD (gastroesophageal reflux disease)  Assessment and Plan of Treatment: GERD (gastroesophageal reflux disease)    Diagnosis: Essential hypertension  Assessment and Plan of Treatment: Essential hypertension    Diagnosis: Obesity (BMI 30-39.9)  Assessment and Plan of Treatment: Obesity (BMI 30-39.9)

## 2020-10-19 NOTE — DISCHARGE NOTE PROVIDER - HOSPITAL COURSE
Leoncio Beatty is a 61 y/o M with a pmh of HTN, GERD, cholelithiasis and no psh who presents as a transfer from [facility] with 1 day history of persistent epigastric pain and tightness without associated nausea or vomiting. At [facility] vitals notable for  with all other vitals wnl. Inital labs notable for WBC 7.05 (lymphocytes 48.4%), Cr 1.42 (unknown baseline), TBili 0.5, ALP 57, AST 56, ALT 60. CT A/P notable for gallbladder wall thickening, pericholecystic fat stranding, cholelithiasis, and CBD wnl. Pt was admitted to general surgery and underwent an uncomplicated lap cholecystectomy on 10/18. Patient's post-operative course was uncomplicated. Diet was advanced as tolerated and pain was well controlled on medication. He was discharged on 10/19, pt deemed stable at that time and ready to return home with plan to follow up as an outpatient.

## 2020-10-19 NOTE — DISCHARGE NOTE PROVIDER - NSDCMRMEDTOKEN_GEN_ALL_CORE_FT
Colace 100 mg oral capsule: 1 cap(s) orally 2 times a day MDD:2 capsules  losartan 50 mg oral tablet: 1 tab(s) orally once a day  oxyCODONE 5 mg oral tablet: 1 tab(s) orally every 6 hours, As Needed for moderate to severe pain. MDD:4  Zantac 150 oral tablet:  orally once a day

## 2020-10-19 NOTE — PROGRESS NOTE ADULT - ASSESSMENT
59 y/o M with pmh of HTN, GERD, cholelithiasis with no psh presenting with acute calculous cholecystitis, now s/p lap kristian. Pt doing well this morning will trial LRD, potential discharge pending PO tolerance.    Low fat diet  Pain/nausea control  IS/OOBA  SCDs/SQH  f/u AM labs  Dispo: home pending PO tolerance

## 2020-10-19 NOTE — DISCHARGE NOTE PROVIDER - CARE PROVIDER_API CALL
Yuniel Reynoso  COLON/RECTAL SURGERY  [address], [address]  [place] [address]  Phone: [phone]  Fax: [phone]  Follow Up Time:

## 2020-10-19 NOTE — PROGRESS NOTE ADULT - SUBJECTIVE AND OBJECTIVE BOX
STATUS POST:  10/18: Bertha townsend      SUBJECTIVE: Patient seen and examined bedside by chief resident. O/N: POC normal. No flatus or bowel movements. Tolerating CLD very well.  (900). This AM, pt reports that her pain is controlled, tolerating CLD, no BM.       heparin   Injectable 5000 Unit(s) SubCutaneous every 8 hours      Vital Signs Last 24 Hrs  T(C): 37.1 (19 Oct 2020 05:19), Max: 37.3 (18 Oct 2020 12:53)  T(F): 98.7 (19 Oct 2020 05:19), Max: 99.2 (18 Oct 2020 12:53)  HR: 62 (19 Oct 2020 05:19) (62 - 97)  BP: 123/85 (19 Oct 2020 05:19) (119/82 - 160/96)  BP(mean): 113 (18 Oct 2020 16:24) (107 - 123)  RR: 17 (19 Oct 2020 05:19) (14 - 18)  SpO2: 95% (19 Oct 2020 05:19) (93% - 100%)  I&O's Detail    18 Oct 2020 07:01  -  19 Oct 2020 07:00  --------------------------------------------------------  IN:    IV PiggyBack: 200 mL    Lactated Ringers: 1250 mL    Other (mL): 600 mL  Total IN: 2050 mL    OUT:    Oral Fluid: 0 mL    Voided (mL): 900 mL  Total OUT: 900 mL    Total NET: 1150 mL      19 Oct 2020 07:01  -  19 Oct 2020 07:09  --------------------------------------------------------  IN:    Lactated Ringers: 100 mL  Total IN: 100 mL    OUT:  Total OUT: 0 mL    Total NET: 100 mL          Physical Exam:  General: No acute distress, resting comfortably in bed  Pulm: Nonlabored breathing, no respiratory distress, on RA  Abd: soft, non-tender, non-distended, incisions clean/dry/intact.      LABS:                        13.0   9.84  )-----------( 191      ( 19 Oct 2020 06:35 )             38.4     10-    138  |  104  |  12  ----------------------------<  117<H>  4.4   |  24  |  0.87    Ca    8.6      19 Oct 2020 06:35  Phos  4.1     10-19  Mg     2.1     10-19    TPro  6.0  /  Alb  3.8  /  TBili  0.7  /  DBili  <0.2  /  AST  89<H>  /  ALT  189<H>  /  AlkPhos  72  1019    PT/INR - ( 17 Oct 2020 23:15 )   PT: 11.5 sec;   INR: 0.97          PTT - ( 17 Oct 2020 23:15 )  PTT:29.6 sec  Urinalysis Basic - ( 18 Oct 2020 03:37 )    Color: Yellow / Appearance: Clear / S.025 / pH: x  Gluc: x / Ketone: NEGATIVE  / Bili: NEGATIVE / Urobili: 0.2 E.U./dL   Blood: x / Protein: NEGATIVE mg/dL / Nitrite: NEGATIVE   Leuk Esterase: NEGATIVE / RBC: x / WBC x   Sq Epi: x / Non Sq Epi: x / Bacteria: x        RADIOLOGY & ADDITIONAL STUDIES:

## 2020-10-19 NOTE — DISCHARGE NOTE PROVIDER - NSDCFUADDINST_GEN_ALL_CORE_FT
Please call Dr. Reynoso's office at [phone] to schedule an outpatient appointment in 1-2 weeks.    General Discharge Instructions:  Please resume all regular home medications unless specifically advised not to take a particular medication. Also, please take any new medications as prescribed.  Please get plenty of rest, continue to ambulate several times per day, and drink adequate amounts of fluids. Avoid lifting weights greater than 5-10 lbs until you follow-up with your surgeon, who will instruct you further regarding activity restrictions.  Avoid driving or operating heavy machinery while taking pain medications.  Please follow-up with your surgeon and Primary Care Provider (PCP) as advised.  Incision Care:  *Please call your doctor or nurse practitioner if you have increased pain, swelling, redness, or drainage from the incision site.  *Avoid swimming and baths until your follow-up appointment.  *You may shower, and wash surgical incisions with a mild soap and warm water. Gently pat the area dry.      Warning Signs:  Please call your doctor or nurse practitioner if you experience the following:  *You experience new chest pain, pressure, squeezing or tightness.  *New or worsening cough, shortness of breath, or wheeze.  *If you are vomiting and cannot keep down fluids or your medications.  *You are getting dehydrated due to continued vomiting, diarrhea, or other reasons. Signs of dehydration include dry mouth, rapid heartbeat, or feeling dizzy or faint when standing.  *You see blood or dark/black material when you vomit or have a bowel movement.  *Call or return immediately if your pain is getting worse, changes location, or moves to your chest or back.  *You have shaking chills, or fever greater than 101.5 degrees Fahrenheit or 38 degrees Celsius.  *Any change in your symptoms, or any new symptoms that concern you.

## 2020-10-19 NOTE — DISCHARGE NOTE NURSING/CASE MANAGEMENT/SOCIAL WORK - PATIENT PORTAL LINK FT
You can access the FollowMyHealth Patient Portal offered by [facility] by registering at the following website: [email or web address]. By joining [employer]’s FollowMyHealth portal, you will also be able to view your health information using other applications (apps) compatible with our system.

## 2020-10-21 LAB — SURGICAL PATHOLOGY STUDY: SIGNIFICANT CHANGE UP

## 2020-10-22 DIAGNOSIS — K21.9 GASTRO-ESOPHAGEAL REFLUX DISEASE WITHOUT ESOPHAGITIS: ICD-10-CM

## 2020-10-22 DIAGNOSIS — E66.9 OBESITY, UNSPECIFIED: ICD-10-CM

## 2020-10-22 DIAGNOSIS — K81.9 CHOLECYSTITIS, UNSPECIFIED: ICD-10-CM

## 2020-10-22 DIAGNOSIS — K80.00 CALCULUS OF GALLBLADDER WITH ACUTE CHOLECYSTITIS WITHOUT OBSTRUCTION: ICD-10-CM

## 2020-10-22 DIAGNOSIS — I12.9 HYPERTENSIVE CHRONIC KIDNEY DISEASE WITH STAGE 1 THROUGH STAGE 4 CHRONIC KIDNEY DISEASE, OR UNSPECIFIED CHRONIC KIDNEY DISEASE: ICD-10-CM

## 2020-10-22 DIAGNOSIS — R74.01 ELEVATION OF LEVELS OF LIVER TRANSAMINASE LEVELS: ICD-10-CM

## 2020-10-22 DIAGNOSIS — R09.82 POSTNASAL DRIP: ICD-10-CM

## 2020-10-22 DIAGNOSIS — N18.2 CHRONIC KIDNEY DISEASE, STAGE 2 (MILD): ICD-10-CM

## 2020-10-22 DIAGNOSIS — N17.9 ACUTE KIDNEY FAILURE, UNSPECIFIED: ICD-10-CM

## 2021-04-20 ENCOUNTER — EMERGENCY (EMERGENCY)
Facility: HOSPITAL | Age: 61
LOS: 1 days | Discharge: HOME | End: 2021-04-20
Admitting: EMERGENCY MEDICINE
Payer: MEDICAID

## 2021-04-20 VITALS
SYSTOLIC BLOOD PRESSURE: 122 MMHG | DIASTOLIC BLOOD PRESSURE: 84 MMHG | TEMPERATURE: 98 F | RESPIRATION RATE: 17 BRPM | WEIGHT: 190.04 LBS | HEIGHT: 69 IN | OXYGEN SATURATION: 99 % | HEART RATE: 73 BPM

## 2021-04-20 VITALS
HEART RATE: 67 BPM | SYSTOLIC BLOOD PRESSURE: 101 MMHG | DIASTOLIC BLOOD PRESSURE: 64 MMHG | RESPIRATION RATE: 18 BRPM | TEMPERATURE: 99 F | OXYGEN SATURATION: 97 %

## 2021-04-20 DIAGNOSIS — R10.32 LEFT LOWER QUADRANT PAIN: ICD-10-CM

## 2021-04-20 DIAGNOSIS — K31.9 DISEASE OF STOMACH AND DUODENUM, UNSPECIFIED: ICD-10-CM

## 2021-04-20 DIAGNOSIS — Z79.83 LONG TERM (CURRENT) USE OF BISPHOSPHONATES: ICD-10-CM

## 2021-04-20 DIAGNOSIS — I10 ESSENTIAL (PRIMARY) HYPERTENSION: ICD-10-CM

## 2021-04-20 DIAGNOSIS — Z90.49 ACQUIRED ABSENCE OF OTHER SPECIFIED PARTS OF DIGESTIVE TRACT: ICD-10-CM

## 2021-04-20 DIAGNOSIS — Z79.899 OTHER LONG TERM (CURRENT) DRUG THERAPY: ICD-10-CM

## 2021-04-20 DIAGNOSIS — K57.92 DIVERTICULITIS OF INTESTINE, PART UNSPECIFIED, WITHOUT PERFORATION OR ABSCESS WITHOUT BLEEDING: ICD-10-CM

## 2021-04-20 PROBLEM — Z87.19 PERSONAL HISTORY OF OTHER DISEASES OF THE DIGESTIVE SYSTEM: Chronic | Status: ACTIVE | Noted: 2020-10-18

## 2021-04-20 LAB
ALBUMIN SERPL ELPH-MCNC: 3.9 G/DL — SIGNIFICANT CHANGE UP (ref 3.4–5)
ALP SERPL-CCNC: 70 U/L — SIGNIFICANT CHANGE UP (ref 40–120)
ALT FLD-CCNC: 37 U/L — SIGNIFICANT CHANGE UP (ref 12–42)
ANION GAP SERPL CALC-SCNC: 7 MMOL/L — LOW (ref 9–16)
APPEARANCE UR: CLEAR — SIGNIFICANT CHANGE UP
APTT BLD: 31 SEC — SIGNIFICANT CHANGE UP (ref 27.5–35.5)
AST SERPL-CCNC: 27 U/L — SIGNIFICANT CHANGE UP (ref 15–37)
BASOPHILS # BLD AUTO: 0.04 K/UL — SIGNIFICANT CHANGE UP (ref 0–0.2)
BASOPHILS NFR BLD AUTO: 0.6 % — SIGNIFICANT CHANGE UP (ref 0–2)
BILIRUB SERPL-MCNC: 0.9 MG/DL — SIGNIFICANT CHANGE UP (ref 0.2–1.2)
BILIRUB UR-MCNC: NEGATIVE — SIGNIFICANT CHANGE UP
BUN SERPL-MCNC: 15 MG/DL — SIGNIFICANT CHANGE UP (ref 7–23)
CALCIUM SERPL-MCNC: 9.6 MG/DL — SIGNIFICANT CHANGE UP (ref 8.5–10.5)
CHLORIDE SERPL-SCNC: 103 MMOL/L — SIGNIFICANT CHANGE UP (ref 96–108)
CO2 SERPL-SCNC: 30 MMOL/L — SIGNIFICANT CHANGE UP (ref 22–31)
COLOR SPEC: YELLOW — SIGNIFICANT CHANGE UP
CREAT SERPL-MCNC: 1.14 MG/DL — SIGNIFICANT CHANGE UP (ref 0.5–1.3)
DIFF PNL FLD: NEGATIVE — SIGNIFICANT CHANGE UP
EOSINOPHIL # BLD AUTO: 0.18 K/UL — SIGNIFICANT CHANGE UP (ref 0–0.5)
EOSINOPHIL NFR BLD AUTO: 2.5 % — SIGNIFICANT CHANGE UP (ref 0–6)
GLUCOSE SERPL-MCNC: 80 MG/DL — SIGNIFICANT CHANGE UP (ref 70–99)
GLUCOSE UR QL: NEGATIVE — SIGNIFICANT CHANGE UP
HCT VFR BLD CALC: 42.4 % — SIGNIFICANT CHANGE UP (ref 39–50)
HGB BLD-MCNC: 14.7 G/DL — SIGNIFICANT CHANGE UP (ref 13–17)
IMM GRANULOCYTES NFR BLD AUTO: 0.8 % — SIGNIFICANT CHANGE UP (ref 0–1.5)
INR BLD: 1.09 — SIGNIFICANT CHANGE UP (ref 0.88–1.16)
KETONES UR-MCNC: NEGATIVE — SIGNIFICANT CHANGE UP
LEUKOCYTE ESTERASE UR-ACNC: NEGATIVE — SIGNIFICANT CHANGE UP
LIDOCAIN IGE QN: 99 U/L — SIGNIFICANT CHANGE UP (ref 73–393)
LYMPHOCYTES # BLD AUTO: 1.73 K/UL — SIGNIFICANT CHANGE UP (ref 1–3.3)
LYMPHOCYTES # BLD AUTO: 24.3 % — SIGNIFICANT CHANGE UP (ref 13–44)
MCHC RBC-ENTMCNC: 31.3 PG — SIGNIFICANT CHANGE UP (ref 27–34)
MCHC RBC-ENTMCNC: 34.7 GM/DL — SIGNIFICANT CHANGE UP (ref 32–36)
MCV RBC AUTO: 90.4 FL — SIGNIFICANT CHANGE UP (ref 80–100)
MONOCYTES # BLD AUTO: 0.76 K/UL — SIGNIFICANT CHANGE UP (ref 0–0.9)
MONOCYTES NFR BLD AUTO: 10.7 % — SIGNIFICANT CHANGE UP (ref 2–14)
NEUTROPHILS # BLD AUTO: 4.34 K/UL — SIGNIFICANT CHANGE UP (ref 1.8–7.4)
NEUTROPHILS NFR BLD AUTO: 61.1 % — SIGNIFICANT CHANGE UP (ref 43–77)
NITRITE UR-MCNC: NEGATIVE — SIGNIFICANT CHANGE UP
NRBC # BLD: 0 /100 WBCS — SIGNIFICANT CHANGE UP (ref 0–0)
PH UR: 7 — SIGNIFICANT CHANGE UP (ref 5–8)
PLATELET # BLD AUTO: 194 K/UL — SIGNIFICANT CHANGE UP (ref 150–400)
POTASSIUM SERPL-MCNC: 4.6 MMOL/L — SIGNIFICANT CHANGE UP (ref 3.5–5.3)
POTASSIUM SERPL-SCNC: 4.6 MMOL/L — SIGNIFICANT CHANGE UP (ref 3.5–5.3)
PROT SERPL-MCNC: 7.7 G/DL — SIGNIFICANT CHANGE UP (ref 6.4–8.2)
PROT UR-MCNC: NEGATIVE MG/DL — SIGNIFICANT CHANGE UP
PROTHROM AB SERPL-ACNC: 13 SEC — SIGNIFICANT CHANGE UP (ref 10.6–13.6)
RBC # BLD: 4.69 M/UL — SIGNIFICANT CHANGE UP (ref 4.2–5.8)
RBC # FLD: 11.7 % — SIGNIFICANT CHANGE UP (ref 10.3–14.5)
SODIUM SERPL-SCNC: 140 MMOL/L — SIGNIFICANT CHANGE UP (ref 132–145)
SP GR SPEC: 1.01 — SIGNIFICANT CHANGE UP (ref 1–1.03)
UROBILINOGEN FLD QL: 0.2 E.U./DL — SIGNIFICANT CHANGE UP
WBC # BLD: 7.11 K/UL — SIGNIFICANT CHANGE UP (ref 3.8–10.5)
WBC # FLD AUTO: 7.11 K/UL — SIGNIFICANT CHANGE UP (ref 3.8–10.5)

## 2021-04-20 PROCEDURE — 99285 EMERGENCY DEPT VISIT HI MDM: CPT

## 2021-04-20 PROCEDURE — 93010 ELECTROCARDIOGRAM REPORT: CPT

## 2021-04-20 PROCEDURE — 74177 CT ABD & PELVIS W/CONTRAST: CPT | Mod: 26

## 2021-04-20 RX ORDER — CIPROFLOXACIN LACTATE 400MG/40ML
1 VIAL (ML) INTRAVENOUS
Qty: 20 | Refills: 0
Start: 2021-04-20 | End: 2021-04-29

## 2021-04-20 RX ORDER — CIPROFLOXACIN LACTATE 400MG/40ML
500 VIAL (ML) INTRAVENOUS ONCE
Refills: 0 | Status: DISCONTINUED | OUTPATIENT
Start: 2021-04-20 | End: 2021-04-20

## 2021-04-20 RX ORDER — IOHEXOL 300 MG/ML
30 INJECTION, SOLUTION INTRAVENOUS ONCE
Refills: 0 | Status: COMPLETED | OUTPATIENT
Start: 2021-04-20 | End: 2021-04-20

## 2021-04-20 RX ORDER — METRONIDAZOLE 500 MG
1 TABLET ORAL
Qty: 30 | Refills: 0
Start: 2021-04-20 | End: 2021-04-29

## 2021-04-20 RX ORDER — METRONIDAZOLE 500 MG
500 TABLET ORAL ONCE
Refills: 0 | Status: COMPLETED | OUTPATIENT
Start: 2021-04-20 | End: 2021-04-20

## 2021-04-20 RX ADMIN — Medication 500 MILLIGRAM(S): at 17:51

## 2021-04-20 RX ADMIN — IOHEXOL 30 MILLILITER(S): 300 INJECTION, SOLUTION INTRAVENOUS at 14:19

## 2021-04-20 NOTE — ED PROVIDER NOTE - ATTESTATION, MLM
toileting/standing/walking
I have reviewed and confirmed nurses' notes for patient's medications, allergies, medical history, and surgical history.

## 2021-04-20 NOTE — ED PROVIDER NOTE - OBJECTIVE STATEMENT
61 y/o M with PMHx of HTN and PSHX for cholecystectomy (October 2020) presents to the ED for LLQ Abd pain x2 days. He describes the pain as localized, constant, "inflamed" to the LLQ with episodic "jolts" of sharp pains. He states the pain is moderate in nature. No aggregating or reliving factors. He denies changes in bowel movements and her last meal was breakfast this morning. He has been intermittently experiencing these symptoms over the past 2-3 months but the pain has been more pronounced over the past 2-3 days. He did not take any medications PTA. He is declining pain medications at this time. He denies fevers, chills, and N/V/D.

## 2021-04-20 NOTE — ED PROVIDER NOTE - LOCATION
Patient advised.  Will work on scheduling with ortho.     Discussed that can take tylenol with tramadol.     All questions answered.     PDMP reveiwed.     Pharmacy: [facility]    Please review/sign    No call back needed.    abdomen

## 2021-04-20 NOTE — ED PROVIDER NOTE - NSFOLLOWUPINSTRUCTIONS_ED_ALL_ED_FT
Diverticulitis    Diverticulitis is inflammation or infection of small pouches in your colon that form when you HAVE a condition called diverticulosis. This condition can range from mild to severe potentially leading to perforation or obstructions of your colon. Symptoms include abdominal pain, fever/chills, nausea, vomiting, diarrhea, constipation, or blood in your stool. If you were prescribed an antibiotic medicine, take it as told by your health care provider. Do not stop taking the antibiotic even if you start to feel better.    PLEASE TAKE 1 TAB CIPROFLOXACIN 500 MG BY MOUTH TWICE A DAY FOR 10 DAYS.  PLEASE TAKE 1 TAB METRONIDAZOLE 500 MG BY MOUTH THREE TIMES A DAY FOR 10 DAYS.    PLEASE FOLLOW UP WITH THE GASTROENTEROLOGIST WITHIN 1 WEEK FOR RE-EVALUATION AND FURTHER MANAGEMENT AS WE DISCUSSED.    RETURN TO THE ER IMMEDIATELY IF YOU HAVE ANY OF THE FOLLOWING SYMPTOMS: worsening abdominal pain, high fever, inability to hold down liquids or medication, black or bloody stools, inability to pass gas, lightheadedness/dizziness, or a change in mental status.

## 2021-04-20 NOTE — ED PROVIDER NOTE - PATIENT PORTAL LINK FT
You can access the FollowMyHealth Patient Portal offered by [facility] by registering at the following website: [email or web address]. By joining [employer]’s FollowMyHealth portal, you will also be able to view your health information using other applications (apps) compatible with our system.

## 2021-04-20 NOTE — ED PROVIDER NOTE - CARE PROVIDER_API CALL
Jeyson Tovar)  Gastroenterology; Internal Medicine  [address] [place] [id number]  Phone: [phone]  Fax: [phone]  Follow Up Time: 1-3 Days

## 2021-04-20 NOTE — ED PROVIDER NOTE - CLINICAL SUMMARY MEDICAL DECISION MAKING FREE TEXT BOX
CT scan reveals mild acute diverticulitis involving the distal descending colon. No perforation or abscess. Patient is afebrile, nontoxic and vital signs are stable. No leukocytosis. The patient is well-appearing and sitting comfortably in NAD. States he feels comfortable being discharged home and agrees to F/U with GI this week for re-evaluation and further management. Will prescribe Cipro and Flagyl, and administer the first dose of each here. Strict return precautions reviewed with pt in which pt verbalizes understanding and agrees to.

## 2022-08-10 NOTE — ED ADULT NURSE NOTE - NS ED NOTE ABUSE RESPONSE YN
Lefty Galeas (daughter) @[phone] she states understanding.
Yes

## 2022-12-02 ENCOUNTER — EMERGENCY (EMERGENCY)
Facility: HOSPITAL | Age: 62
LOS: 1 days | Discharge: HOME | End: 2022-12-02
Attending: EMERGENCY MEDICINE | Admitting: EMERGENCY MEDICINE

## 2022-12-02 VITALS
HEART RATE: 115 BPM | SYSTOLIC BLOOD PRESSURE: 143 MMHG | WEIGHT: 214.95 LBS | TEMPERATURE: 103 F | RESPIRATION RATE: 20 BRPM | OXYGEN SATURATION: 95 % | HEIGHT: 69 IN | DIASTOLIC BLOOD PRESSURE: 97 MMHG

## 2022-12-02 VITALS
DIASTOLIC BLOOD PRESSURE: 74 MMHG | SYSTOLIC BLOOD PRESSURE: 114 MMHG | TEMPERATURE: 100 F | HEART RATE: 88 BPM | OXYGEN SATURATION: 96 % | RESPIRATION RATE: 20 BRPM

## 2022-12-02 LAB
ALBUMIN SERPL ELPH-MCNC: 3.8 G/DL — SIGNIFICANT CHANGE UP (ref 3.4–5)
ALP SERPL-CCNC: 46 U/L — SIGNIFICANT CHANGE UP (ref 40–120)
ALT FLD-CCNC: 81 U/L — HIGH (ref 12–42)
ANION GAP SERPL CALC-SCNC: 10 MMOL/L — SIGNIFICANT CHANGE UP (ref 9–16)
APPEARANCE UR: CLEAR — SIGNIFICANT CHANGE UP
APTT BLD: 29 SEC — SIGNIFICANT CHANGE UP (ref 27.5–35.5)
AST SERPL-CCNC: 56 U/L — HIGH (ref 15–37)
BACTERIA # UR AUTO: SIGNIFICANT CHANGE UP /HPF
BASOPHILS # BLD AUTO: 0.01 K/UL — SIGNIFICANT CHANGE UP (ref 0–0.2)
BASOPHILS NFR BLD AUTO: 0.2 % — SIGNIFICANT CHANGE UP (ref 0–2)
BILIRUB SERPL-MCNC: 1.1 MG/DL — SIGNIFICANT CHANGE UP (ref 0.2–1.2)
BILIRUB UR-MCNC: NEGATIVE — SIGNIFICANT CHANGE UP
BUN SERPL-MCNC: 13 MG/DL — SIGNIFICANT CHANGE UP (ref 7–23)
CALCIUM SERPL-MCNC: 8.7 MG/DL — SIGNIFICANT CHANGE UP (ref 8.5–10.5)
CHLORIDE SERPL-SCNC: 98 MMOL/L — SIGNIFICANT CHANGE UP (ref 96–108)
CO2 SERPL-SCNC: 26 MMOL/L — SIGNIFICANT CHANGE UP (ref 22–31)
COLOR SPEC: YELLOW — SIGNIFICANT CHANGE UP
CREAT SERPL-MCNC: 1.24 MG/DL — SIGNIFICANT CHANGE UP (ref 0.5–1.3)
DIFF PNL FLD: ABNORMAL
EGFR: 66 ML/MIN/1.73M2 — SIGNIFICANT CHANGE UP
EOSINOPHIL # BLD AUTO: 0.03 K/UL — SIGNIFICANT CHANGE UP (ref 0–0.5)
EOSINOPHIL NFR BLD AUTO: 0.6 % — SIGNIFICANT CHANGE UP (ref 0–6)
EPI CELLS # UR: SIGNIFICANT CHANGE UP /HPF (ref 0–5)
FLUAV H1 2009 PAND RNA SPEC QL NAA+PROBE: DETECTED
FLUAV H1 RNA SPEC QL NAA+PROBE: SIGNIFICANT CHANGE UP
FLUAV H3 RNA SPEC QL NAA+PROBE: SIGNIFICANT CHANGE UP
FLUAV SUBTYP SPEC NAA+PROBE: DETECTED
FLUBV RNA SPEC QL NAA+PROBE: SIGNIFICANT CHANGE UP
GLUCOSE SERPL-MCNC: 124 MG/DL — HIGH (ref 70–99)
GLUCOSE UR QL: NEGATIVE — SIGNIFICANT CHANGE UP
HCT VFR BLD CALC: 41.4 % — SIGNIFICANT CHANGE UP (ref 39–50)
HGB BLD-MCNC: 14.3 G/DL — SIGNIFICANT CHANGE UP (ref 13–17)
HIV 1 & 2 AB SERPL IA.RAPID: SIGNIFICANT CHANGE UP
IMM GRANULOCYTES NFR BLD AUTO: 0.6 % — SIGNIFICANT CHANGE UP (ref 0–0.9)
INR BLD: 1.19 — HIGH (ref 0.88–1.16)
KETONES UR-MCNC: NEGATIVE — SIGNIFICANT CHANGE UP
LACTATE SERPL-SCNC: 1.1 MMOL/L — SIGNIFICANT CHANGE UP (ref 0.4–2)
LEUKOCYTE ESTERASE UR-ACNC: NEGATIVE — SIGNIFICANT CHANGE UP
LYMPHOCYTES # BLD AUTO: 0.95 K/UL — LOW (ref 1–3.3)
LYMPHOCYTES # BLD AUTO: 18.4 % — SIGNIFICANT CHANGE UP (ref 13–44)
MCHC RBC-ENTMCNC: 31.4 PG — SIGNIFICANT CHANGE UP (ref 27–34)
MCHC RBC-ENTMCNC: 34.5 GM/DL — SIGNIFICANT CHANGE UP (ref 32–36)
MCV RBC AUTO: 91 FL — SIGNIFICANT CHANGE UP (ref 80–100)
MONOCYTES # BLD AUTO: 0.65 K/UL — SIGNIFICANT CHANGE UP (ref 0–0.9)
MONOCYTES NFR BLD AUTO: 12.6 % — SIGNIFICANT CHANGE UP (ref 2–14)
NEUTROPHILS # BLD AUTO: 3.5 K/UL — SIGNIFICANT CHANGE UP (ref 1.8–7.4)
NEUTROPHILS NFR BLD AUTO: 67.6 % — SIGNIFICANT CHANGE UP (ref 43–77)
NITRITE UR-MCNC: NEGATIVE — SIGNIFICANT CHANGE UP
NRBC # BLD: 0 /100 WBCS — SIGNIFICANT CHANGE UP (ref 0–0)
PH UR: 6 — SIGNIFICANT CHANGE UP (ref 5–8)
PLATELET # BLD AUTO: 156 K/UL — SIGNIFICANT CHANGE UP (ref 150–400)
POTASSIUM SERPL-MCNC: 3.7 MMOL/L — SIGNIFICANT CHANGE UP (ref 3.5–5.3)
POTASSIUM SERPL-SCNC: 3.7 MMOL/L — SIGNIFICANT CHANGE UP (ref 3.5–5.3)
PROT SERPL-MCNC: 7.3 G/DL — SIGNIFICANT CHANGE UP (ref 6.4–8.2)
PROT UR-MCNC: NEGATIVE MG/DL — SIGNIFICANT CHANGE UP
PROTHROM AB SERPL-ACNC: 13.9 SEC — HIGH (ref 10.5–13.4)
RAPID RVP RESULT: DETECTED
RBC # BLD: 4.55 M/UL — SIGNIFICANT CHANGE UP (ref 4.2–5.8)
RBC # FLD: 11.3 % — SIGNIFICANT CHANGE UP (ref 10.3–14.5)
RBC CASTS # UR COMP ASSIST: < 5 /HPF — SIGNIFICANT CHANGE UP
S PYO AG SPEC QL IA: NEGATIVE — SIGNIFICANT CHANGE UP
SARS-COV-2 RNA SPEC QL NAA+PROBE: SIGNIFICANT CHANGE UP
SODIUM SERPL-SCNC: 134 MMOL/L — SIGNIFICANT CHANGE UP (ref 132–145)
SP GR SPEC: 1.01 — SIGNIFICANT CHANGE UP (ref 1–1.03)
UROBILINOGEN FLD QL: 1 E.U./DL — SIGNIFICANT CHANGE UP
WBC # BLD: 5.17 K/UL — SIGNIFICANT CHANGE UP (ref 3.8–10.5)
WBC # FLD AUTO: 5.17 K/UL — SIGNIFICANT CHANGE UP (ref 3.8–10.5)
WBC UR QL: < 5 /HPF — SIGNIFICANT CHANGE UP

## 2022-12-02 PROCEDURE — 71045 X-RAY EXAM CHEST 1 VIEW: CPT | Mod: 26

## 2022-12-02 PROCEDURE — 93010 ELECTROCARDIOGRAM REPORT: CPT

## 2022-12-02 PROCEDURE — 99291 CRITICAL CARE FIRST HOUR: CPT

## 2022-12-02 RX ORDER — IBUPROFEN 200 MG
600 TABLET ORAL ONCE
Refills: 0 | Status: COMPLETED | OUTPATIENT
Start: 2022-12-02 | End: 2022-12-02

## 2022-12-02 RX ORDER — CEFTRIAXONE 500 MG/1
1000 INJECTION, POWDER, FOR SOLUTION INTRAMUSCULAR; INTRAVENOUS ONCE
Refills: 0 | Status: COMPLETED | OUTPATIENT
Start: 2022-12-02 | End: 2022-12-02

## 2022-12-02 RX ORDER — AZITHROMYCIN 500 MG/1
500 TABLET, FILM COATED ORAL ONCE
Refills: 0 | Status: COMPLETED | OUTPATIENT
Start: 2022-12-02 | End: 2022-12-02

## 2022-12-02 RX ORDER — IBUPROFEN 200 MG
1 TABLET ORAL
Qty: 20 | Refills: 0
Start: 2022-12-02 | End: 2022-12-06

## 2022-12-02 RX ORDER — SODIUM CHLORIDE 9 MG/ML
3000 INJECTION INTRAMUSCULAR; INTRAVENOUS; SUBCUTANEOUS ONCE
Refills: 0 | Status: COMPLETED | OUTPATIENT
Start: 2022-12-02 | End: 2022-12-02

## 2022-12-02 RX ORDER — ACETAMINOPHEN 500 MG
650 TABLET ORAL ONCE
Refills: 0 | Status: COMPLETED | OUTPATIENT
Start: 2022-12-02 | End: 2022-12-02

## 2022-12-02 RX ADMIN — SODIUM CHLORIDE 3000 MILLILITER(S): 9 INJECTION INTRAMUSCULAR; INTRAVENOUS; SUBCUTANEOUS at 14:51

## 2022-12-02 RX ADMIN — Medication 75 MILLIGRAM(S): at 17:44

## 2022-12-02 RX ADMIN — AZITHROMYCIN 255 MILLIGRAM(S): 500 TABLET, FILM COATED ORAL at 14:51

## 2022-12-02 RX ADMIN — Medication 600 MILLIGRAM(S): at 14:50

## 2022-12-02 RX ADMIN — CEFTRIAXONE 100 MILLIGRAM(S): 500 INJECTION, POWDER, FOR SOLUTION INTRAMUSCULAR; INTRAVENOUS at 14:50

## 2022-12-02 RX ADMIN — Medication 650 MILLIGRAM(S): at 14:50

## 2022-12-02 NOTE — ED ADULT NURSE NOTE - OBJECTIVE STATEMENT
pt. presents to the Ed with nausea, vomiting and diarrhea since monday and fever x2 days. Pt. generally feeling unwell and weak noted to have temp f 105.6 on arrival.

## 2022-12-02 NOTE — ED PROVIDER NOTE - CRITICAL CARE ATTENDING CONTRIBUTION TO CARE
The patient was seen immediately upon arrival due to a high probability of imminent or life-threatening deterioration secondary to fever, tachycardia, triggering sepsis alert, IVFs, broad spectrum abxs, which required my direct attention, intervention, and personal management at the bedside. I have personally provided critical care time exclusive of time spent on separately billable procedures. Time includes review of laboratory data, radiology results, discussion with consultants, discussion with the patient's family, and monitoring for potential decompensation.

## 2022-12-02 NOTE — ED PROVIDER NOTE - CLINICAL SUMMARY MEDICAL DECISION MAKING FREE TEXT BOX
61 y/o M presenting with fevers, chills, coughs, and malaise. On exam, Pt appears well and in no acute distress. Plan for sepsis work up in the setting of fevers and tachycardia. Plan for fluids, broad spectrum Antibiotics, cultures, HIV testing, RPR, and fever control. Will reassess clinically after results have been obtained.

## 2022-12-02 NOTE — ED PROVIDER NOTE - OBJECTIVE STATEMENT
61 y/o M with PMH of hypertension [on Losartan], GERD [on Famotidine], currently on Descovy for PREP and allergy medications PRN, presents to the ED for fevers, chills, malaise, and dry coughs. Pt reports having an episode of N/V and loose bowel movements 5 days ago that resolved after 48 hours. Today, he started having fevers. Pt denies urinary symptoms or rash. He was tested [-] for HIV and STDs in September. He was treated for syphilis in the past. He denies dysuria, penile lesions, or discharge.

## 2022-12-02 NOTE — ED PROVIDER NOTE - NSFOLLOWUPINSTRUCTIONS_ED_ALL_ED_FT
Viral Respiratory Infection    A viral respiratory infection is an illness that affects parts of the body used for breathing, like the lungs, nose, and throat. It is caused by a germ called a virus. Symptoms can include runny nose, coughing, sneezing, fatigue, body aches, sore throat, fever, or headache. Over the counter medicine can be used to manage the symptoms but the infection typically goes away on its own in 5 to 10 days.     SEEK IMMEDIATE MEDICAL CARE IF YOU HAVE ANY OF THE FOLLOWING SYMPTOMS: shortness of breath, chest pain, fever over 10 days, or lightheadedness/dizziness.    Alternate motrin 600 mg and tylenol 650 mg every 6 hours for body aches and fever.    If any worsening symptoms return to ER

## 2022-12-02 NOTE — ED PROVIDER NOTE - PATIENT PORTAL LINK FT
You can access the FollowMyHealth Patient Portal offered by [facility] by registering at the following website: [email or web address]. By joining [employer]’s FollowMyHealth portal, you will also be able to view your health information using other applications (apps) compatible with our system.

## 2022-12-02 NOTE — ED ADULT TRIAGE NOTE - CHIEF COMPLAINT QUOTE
reports Monday after lunch, developed N/V and fevers (102F), once N/V stopped, c/o chest discomfort/ "bronchial" pain. took 650mg tylenol at 10am. SIRS criteria met, protocol initiated and followed. covid vaccine x 4 and flu shot

## 2022-12-02 NOTE — ED PROVIDER NOTE - PROGRESS NOTE DETAILS
sepsis work up unremarkable except for + flu A. Started Tamiflu. normal CXR, read by radiology, no hypoxia. Will dc w instructions for supportive tx. RTER if any worsening.

## 2022-12-03 LAB
CULTURE RESULTS: NO GROWTH — SIGNIFICANT CHANGE UP
SPECIMEN SOURCE: SIGNIFICANT CHANGE UP
T PALLIDUM AB TITR SER: NEGATIVE — SIGNIFICANT CHANGE UP

## 2022-12-04 LAB
CULTURE RESULTS: SIGNIFICANT CHANGE UP
SPECIMEN SOURCE: SIGNIFICANT CHANGE UP

## 2022-12-06 DIAGNOSIS — R50.9 FEVER, UNSPECIFIED: ICD-10-CM

## 2022-12-06 DIAGNOSIS — I10 ESSENTIAL (PRIMARY) HYPERTENSION: ICD-10-CM

## 2022-12-06 DIAGNOSIS — R00.0 TACHYCARDIA, UNSPECIFIED: ICD-10-CM

## 2022-12-06 DIAGNOSIS — K21.9 GASTRO-ESOPHAGEAL REFLUX DISEASE WITHOUT ESOPHAGITIS: ICD-10-CM

## 2022-12-06 DIAGNOSIS — Z20.822 CONTACT WITH AND (SUSPECTED) EXPOSURE TO COVID-19: ICD-10-CM

## 2022-12-06 DIAGNOSIS — J11.1 INFLUENZA DUE TO UNIDENTIFIED INFLUENZA VIRUS WITH OTHER RESPIRATORY MANIFESTATIONS: ICD-10-CM

## 2022-12-08 LAB
CULTURE RESULTS: SIGNIFICANT CHANGE UP
CULTURE RESULTS: SIGNIFICANT CHANGE UP
SPECIMEN SOURCE: SIGNIFICANT CHANGE UP
SPECIMEN SOURCE: SIGNIFICANT CHANGE UP

## 2025-04-03 NOTE — ED PROVIDER NOTE - WET READ LAUNCH FT
----- Message from Dr. Fareed Burgess MD sent at 4/3/2025 11:07 AM EDT -----  Rx sent for UTI  
Patient no longer has insurance will call office to follow up once he has insurance to make a follow up appointment   
Pt notified.   
There are no Wet Read(s) to document.
